# Patient Record
Sex: FEMALE | Race: OTHER | Employment: FULL TIME | URBAN - NONMETROPOLITAN AREA
[De-identification: names, ages, dates, MRNs, and addresses within clinical notes are randomized per-mention and may not be internally consistent; named-entity substitution may affect disease eponyms.]

---

## 2023-12-26 ENCOUNTER — HOSPITAL ENCOUNTER (EMERGENCY)
Facility: HOSPITAL | Age: 28
Discharge: HOME | End: 2023-12-26
Attending: EMERGENCY MEDICINE

## 2023-12-26 VITALS
HEART RATE: 92 BPM | DIASTOLIC BLOOD PRESSURE: 62 MMHG | RESPIRATION RATE: 15 BRPM | OXYGEN SATURATION: 98 % | WEIGHT: 164 LBS | SYSTOLIC BLOOD PRESSURE: 105 MMHG | TEMPERATURE: 98 F

## 2023-12-26 DIAGNOSIS — R11.2 NAUSEA & VOMITING: ICD-10-CM

## 2023-12-26 DIAGNOSIS — N39.0 UTI (URINARY TRACT INFECTION): Primary | ICD-10-CM

## 2023-12-26 DIAGNOSIS — M79.10 MYALGIA: ICD-10-CM

## 2023-12-26 LAB
ALBUMIN SERPL BCP-MCNC: 4.3 G/DL (ref 3.5–5)
ALP SERPL-CCNC: 58 U/L (ref 34–104)
ALT SERPL W P-5'-P-CCNC: 13 U/L (ref 7–52)
ANION GAP SERPL CALCULATED.3IONS-SCNC: 9 MMOL/L
AST SERPL W P-5'-P-CCNC: 13 U/L (ref 13–39)
BACTERIA UR QL AUTO: ABNORMAL /HPF
BILIRUB SERPL-MCNC: 0.41 MG/DL (ref 0.2–1)
BILIRUB UR QL STRIP: NEGATIVE
BUN SERPL-MCNC: 13 MG/DL (ref 5–25)
CALCIUM SERPL-MCNC: 8.8 MG/DL (ref 8.4–10.2)
CHLORIDE SERPL-SCNC: 102 MMOL/L (ref 96–108)
CLARITY UR: CLEAR
CO2 SERPL-SCNC: 25 MMOL/L (ref 21–32)
COLOR UR: YELLOW
CREAT SERPL-MCNC: 0.46 MG/DL (ref 0.6–1.3)
EXT PREGNANCY TEST URINE: NEGATIVE
EXT. CONTROL: NORMAL
FLUAV RNA RESP QL NAA+PROBE: NEGATIVE
FLUBV RNA RESP QL NAA+PROBE: NEGATIVE
GFR SERPL CREATININE-BSD FRML MDRD: 136 ML/MIN/1.73SQ M
GLUCOSE SERPL-MCNC: 115 MG/DL (ref 65–140)
GLUCOSE UR STRIP-MCNC: NEGATIVE MG/DL
HGB UR QL STRIP.AUTO: ABNORMAL
KETONES UR STRIP-MCNC: NEGATIVE MG/DL
LEUKOCYTE ESTERASE UR QL STRIP: ABNORMAL
LIPASE SERPL-CCNC: 21 U/L (ref 11–82)
NITRITE UR QL STRIP: NEGATIVE
NON-SQ EPI CELLS URNS QL MICRO: ABNORMAL /HPF
OTHER STN SPEC: ABNORMAL
PH UR STRIP.AUTO: 6.5 [PH]
POTASSIUM SERPL-SCNC: 3.5 MMOL/L (ref 3.5–5.3)
PROT SERPL-MCNC: 7.1 G/DL (ref 6.4–8.4)
PROT UR STRIP-MCNC: NEGATIVE MG/DL
RBC #/AREA URNS AUTO: ABNORMAL /HPF
RSV RNA RESP QL NAA+PROBE: NEGATIVE
SARS-COV-2 RNA RESP QL NAA+PROBE: NEGATIVE
SODIUM SERPL-SCNC: 136 MMOL/L (ref 135–147)
SP GR UR STRIP.AUTO: 1.02 (ref 1–1.03)
UROBILINOGEN UR QL STRIP.AUTO: 0.2 E.U./DL
WBC #/AREA URNS AUTO: ABNORMAL /HPF

## 2023-12-26 PROCEDURE — 96374 THER/PROPH/DIAG INJ IV PUSH: CPT

## 2023-12-26 PROCEDURE — 80053 COMPREHEN METABOLIC PANEL: CPT | Performed by: EMERGENCY MEDICINE

## 2023-12-26 PROCEDURE — 81001 URINALYSIS AUTO W/SCOPE: CPT | Performed by: EMERGENCY MEDICINE

## 2023-12-26 PROCEDURE — 81025 URINE PREGNANCY TEST: CPT | Performed by: EMERGENCY MEDICINE

## 2023-12-26 PROCEDURE — 99284 EMERGENCY DEPT VISIT MOD MDM: CPT

## 2023-12-26 PROCEDURE — 0241U HB NFCT DS VIR RESP RNA 4 TRGT: CPT | Performed by: EMERGENCY MEDICINE

## 2023-12-26 PROCEDURE — 83690 ASSAY OF LIPASE: CPT | Performed by: EMERGENCY MEDICINE

## 2023-12-26 PROCEDURE — 96375 TX/PRO/DX INJ NEW DRUG ADDON: CPT

## 2023-12-26 PROCEDURE — 96361 HYDRATE IV INFUSION ADD-ON: CPT

## 2023-12-26 PROCEDURE — 85025 COMPLETE CBC W/AUTO DIFF WBC: CPT | Performed by: EMERGENCY MEDICINE

## 2023-12-26 PROCEDURE — 93005 ELECTROCARDIOGRAM TRACING: CPT

## 2023-12-26 PROCEDURE — 36415 COLL VENOUS BLD VENIPUNCTURE: CPT | Performed by: EMERGENCY MEDICINE

## 2023-12-26 PROCEDURE — 99284 EMERGENCY DEPT VISIT MOD MDM: CPT | Performed by: EMERGENCY MEDICINE

## 2023-12-26 PROCEDURE — 87651 STREP A DNA AMP PROBE: CPT | Performed by: EMERGENCY MEDICINE

## 2023-12-26 RX ORDER — ONDANSETRON 4 MG/1
4 TABLET, ORALLY DISINTEGRATING ORAL EVERY 6 HOURS PRN
Qty: 20 TABLET | Refills: 0 | Status: SHIPPED | OUTPATIENT
Start: 2023-12-26

## 2023-12-26 RX ORDER — ACETAMINOPHEN 325 MG/1
650 TABLET ORAL ONCE
Status: COMPLETED | OUTPATIENT
Start: 2023-12-26 | End: 2023-12-26

## 2023-12-26 RX ORDER — METOCLOPRAMIDE HYDROCHLORIDE 5 MG/ML
10 INJECTION INTRAMUSCULAR; INTRAVENOUS ONCE
Status: COMPLETED | OUTPATIENT
Start: 2023-12-26 | End: 2023-12-26

## 2023-12-26 RX ORDER — NAPROXEN 500 MG/1
500 TABLET ORAL 2 TIMES DAILY WITH MEALS
Qty: 30 TABLET | Refills: 0 | Status: SHIPPED | OUTPATIENT
Start: 2023-12-26

## 2023-12-26 RX ORDER — DIPHENHYDRAMINE HYDROCHLORIDE 50 MG/ML
25 INJECTION INTRAMUSCULAR; INTRAVENOUS ONCE
Status: COMPLETED | OUTPATIENT
Start: 2023-12-26 | End: 2023-12-26

## 2023-12-26 RX ORDER — KETOROLAC TROMETHAMINE 30 MG/ML
30 INJECTION, SOLUTION INTRAMUSCULAR; INTRAVENOUS ONCE
Status: COMPLETED | OUTPATIENT
Start: 2023-12-26 | End: 2023-12-26

## 2023-12-26 RX ORDER — NITROFURANTOIN 25; 75 MG/1; MG/1
100 CAPSULE ORAL ONCE
Status: COMPLETED | OUTPATIENT
Start: 2023-12-26 | End: 2023-12-26

## 2023-12-26 RX ORDER — NITROFURANTOIN 25; 75 MG/1; MG/1
100 CAPSULE ORAL 2 TIMES DAILY
Qty: 10 CAPSULE | Refills: 0 | Status: SHIPPED | OUTPATIENT
Start: 2023-12-26

## 2023-12-26 RX ADMIN — ACETAMINOPHEN 650 MG: 325 TABLET, FILM COATED ORAL at 22:26

## 2023-12-26 RX ADMIN — NITROFURANTOIN (MONOHYDRATE/MACROCRYSTALS) 100 MG: 75; 25 CAPSULE ORAL at 23:25

## 2023-12-26 RX ADMIN — METOCLOPRAMIDE 10 MG: 5 INJECTION, SOLUTION INTRAMUSCULAR; INTRAVENOUS at 22:26

## 2023-12-26 RX ADMIN — KETOROLAC TROMETHAMINE 30 MG: 30 INJECTION, SOLUTION INTRAMUSCULAR; INTRAVENOUS at 22:26

## 2023-12-26 RX ADMIN — SODIUM CHLORIDE 1000 ML: 0.9 INJECTION, SOLUTION INTRAVENOUS at 22:26

## 2023-12-26 RX ADMIN — DIPHENHYDRAMINE HYDROCHLORIDE 25 MG: 50 INJECTION, SOLUTION INTRAMUSCULAR; INTRAVENOUS at 22:26

## 2023-12-26 NOTE — Clinical Note
Yarinne Veloz Torres was seen and treated in our emergency department on 12/26/2023.                Diagnosis:     Yarinne  .    She may return on this date: 12/29/2023         If you have any questions or concerns, please don't hesitate to call.      Houston Sharma, DO    ______________________________           _______________          _______________  Hospital Representative                              Date                                Time

## 2023-12-27 LAB
ATRIAL RATE: 89 BPM
BASOPHILS # BLD AUTO: 0.02 THOUSANDS/ÂΜL (ref 0–0.1)
BASOPHILS NFR BLD AUTO: 0 % (ref 0–1)
EOSINOPHIL # BLD AUTO: 0.07 THOUSAND/ÂΜL (ref 0–0.61)
EOSINOPHIL NFR BLD AUTO: 1 % (ref 0–6)
ERYTHROCYTE [DISTWIDTH] IN BLOOD BY AUTOMATED COUNT: 11.3 % (ref 11.6–15.1)
HCT VFR BLD AUTO: 42.4 % (ref 34.8–46.1)
HGB BLD-MCNC: 13.8 G/DL (ref 11.5–15.4)
IMM GRANULOCYTES # BLD AUTO: 0.03 THOUSAND/UL (ref 0–0.2)
IMM GRANULOCYTES NFR BLD AUTO: 0 % (ref 0–2)
LYMPHOCYTES # BLD AUTO: 0.62 THOUSANDS/ÂΜL (ref 0.6–4.47)
LYMPHOCYTES NFR BLD AUTO: 5 % (ref 14–44)
MCH RBC QN AUTO: 30.7 PG (ref 26.8–34.3)
MCHC RBC AUTO-ENTMCNC: 32.5 G/DL (ref 31.4–37.4)
MCV RBC AUTO: 94 FL (ref 82–98)
MONOCYTES # BLD AUTO: 0.38 THOUSAND/ÂΜL (ref 0.17–1.22)
MONOCYTES NFR BLD AUTO: 3 % (ref 4–12)
NEUTROPHILS # BLD AUTO: 11.08 THOUSANDS/ÂΜL (ref 1.85–7.62)
NEUTS SEG NFR BLD AUTO: 91 % (ref 43–75)
NRBC BLD AUTO-RTO: 0 /100 WBCS
P AXIS: 65 DEGREES
PLATELET # BLD AUTO: 198 THOUSANDS/UL (ref 149–390)
PMV BLD AUTO: 13 FL (ref 8.9–12.7)
PR INTERVAL: 160 MS
QRS AXIS: 67 DEGREES
QRSD INTERVAL: 80 MS
QT INTERVAL: 354 MS
QTC INTERVAL: 430 MS
RBC # BLD AUTO: 4.49 MILLION/UL (ref 3.81–5.12)
S PYO DNA THROAT QL NAA+PROBE: NOT DETECTED
T WAVE AXIS: 26 DEGREES
VENTRICULAR RATE: 89 BPM
WBC # BLD AUTO: 12.2 THOUSAND/UL (ref 4.31–10.16)

## 2023-12-27 NOTE — ED PROVIDER NOTES
History  Chief Complaint   Patient presents with    Abdominal Pain     Onset this morning. C/O lower/mid back pain, N/V       Abdominal Pain  Associated symptoms: chills, fatigue, fever, nausea, sore throat and vomiting    Associated symptoms: no chest pain, no cough, no diarrhea, no dysuria, no hematuria and no shortness of breath    This is a 28-year-old female she has a past medical history significant for lumbar disc disease and intermittent low back pain, history of provoked PE in the setting of pregnancy on anticoagulation x 6 months, no longer on anticoagulation, presenting for 2-day history of gradual onset fatigue malaise fevers chills headache sore throat nausea vomiting upper abdominal pain myalgias.    Patient reports positive sick contacts.  Reports symptoms gradually worsened throughout the day.  Notes some bodyaches pain in the flank and the thighs and the shoulder and back region.  Notes some epigastric discomfort.  Denies any right lower quadrant or right upper quadrant abdominal pain.  Reports no prior surgical history.  Denies pregnancy vaginal bleeding vaginal discharge pelvic pain.  Reports no urinary symptoms but reports is peeing less.  Has decreased p.o. tolerance today.  Reports the aches and pains she has experienced in the past.  Regarding back pain patient denies any focal weakness, saddle anesthesia, numbness, tingling.    No known drug allergies.    None       Past Medical History:   Diagnosis Date    Blood clot embolism during pregnancy, antepartum        No past surgical history on file.    No family history on file.  I have reviewed and agree with the history as documented.    E-Cigarette/Vaping     E-Cigarette/Vaping Substances          Review of Systems   Constitutional:  Positive for activity change, appetite change, chills, fatigue and fever.   HENT:  Positive for congestion, postnasal drip and sore throat. Negative for ear pain.    Eyes:  Negative for pain and visual  disturbance.   Respiratory:  Negative for cough and shortness of breath.    Cardiovascular:  Negative for chest pain and palpitations.   Gastrointestinal:  Positive for abdominal pain, nausea and vomiting. Negative for abdominal distention and diarrhea.   Genitourinary:  Negative for dysuria, flank pain, hematuria, menstrual problem and pelvic pain.   Musculoskeletal:  Positive for back pain and myalgias. Negative for arthralgias, neck pain and neck stiffness.   Skin:  Negative for color change and rash.   Neurological:  Negative for seizures and syncope.   All other systems reviewed and are negative.      Physical Exam  Physical Exam  Vitals and nursing note reviewed. Exam conducted with a chaperone present.   Constitutional:       General: She is in acute distress.      Appearance: She is well-developed. She is ill-appearing. She is not toxic-appearing or diaphoretic.   HENT:      Head: Normocephalic and atraumatic.      Mouth/Throat:      Mouth: Mucous membranes are moist.      Pharynx: Oropharynx is clear. Posterior oropharyngeal erythema present.      Tonsils: No tonsillar exudate or tonsillar abscesses. 2+ on the right. 2+ on the left.      Comments: Chronically enlarged tonsils bilaterally.  No asymmetry.  No stridor.  No dysphonia.  No Ludewig's angina.  Chronic dental decay with prior dental work.  No signs of acute dental abscess.  No oral mucosal lesions.  Posterior oropharyngeal erythema.  No exudates.  Eyes:      General: No scleral icterus.     Extraocular Movements: Extraocular movements intact.   Cardiovascular:      Rate and Rhythm: Normal rate and regular rhythm.      Heart sounds: Normal heart sounds.   Pulmonary:      Effort: Pulmonary effort is normal.      Breath sounds: Normal breath sounds. No wheezing, rhonchi or rales.   Abdominal:      General: There is no distension.      Palpations: Abdomen is soft.      Tenderness: There is abdominal tenderness in the epigastric area. There is no right  CVA tenderness, left CVA tenderness, guarding or rebound. Negative signs include Guzman's sign and McBurney's sign.   Skin:     General: Skin is warm and dry.      Capillary Refill: Capillary refill takes less than 2 seconds.   Neurological:      General: No focal deficit present.      Mental Status: She is alert.         Vital Signs  ED Triage Vitals [12/26/23 2117]   Temperature Pulse Respirations Blood Pressure SpO2   98 °F (36.7 °C) 89 18 119/60 97 %      Temp Source Heart Rate Source Patient Position - Orthostatic VS BP Location FiO2 (%)   Temporal Monitor Lying Right arm --      Pain Score       8           Vitals:    12/26/23 2117 12/26/23 2330   BP: 119/60 105/62   Pulse: 89 92   Patient Position - Orthostatic VS: Lying Lying         Visual Acuity      ED Medications  Medications   sodium chloride 0.9 % bolus 1,000 mL (1,000 mL Intravenous New Bag 12/26/23 2226)   metoclopramide (REGLAN) injection 10 mg (10 mg Intravenous Given 12/26/23 2226)   diphenhydrAMINE (BENADRYL) injection 25 mg (25 mg Intravenous Given 12/26/23 2226)   ketorolac (TORADOL) injection 30 mg (30 mg Intravenous Given 12/26/23 2226)   acetaminophen (TYLENOL) tablet 650 mg (650 mg Oral Given 12/26/23 2226)   nitrofurantoin (MACROBID) extended-release capsule 100 mg (100 mg Oral Given 12/26/23 2325)       Diagnostic Studies  Results Reviewed       Procedure Component Value Units Date/Time    Strep A PCR [[id number]]     Lab Status: No result Specimen: Throat     FLU/RSV/COVID - if FLU/RSV clinically relevant [[id number]]  (Normal) Collected: 12/26/23 2206    Lab Status: Final result Specimen: Nares from Nose Updated: 12/26/23 2304     SARS-CoV-2 Negative     INFLUENZA A PCR Negative     INFLUENZA B PCR Negative     RSV PCR Negative    Narrative:      FOR PEDIATRIC PATIENTS - copy/paste COVID Guidelines URL to browser: [email or web address]    SARS-CoV-2 assay is a Nucleic Acid Amplification  assay intended for the  qualitative detection of nucleic acid from SARS-CoV-2 in nasopharyngeal  swabs. Results are for the presumptive identification of SARS-CoV-2 RNA.    Positive results are indicative of infection with SARS-CoV-2, the virus  causing COVID-19, but do not rule out bacterial infection or co-infection  with other viruses. Laboratories within the [place] and its  territories are required to report all positive results to the appropriate  public health authorities. Negative results do not preclude SARS-CoV-2  infection and should not be used as the sole basis for treatment or other  patient management decisions. Negative results must be combined with  clinical observations, patient history, and epidemiological information.  This test has not been FDA cleared or approved.    This test has been authorized by FDA under an Emergency Use Authorization  (EUA). This test is only authorized for the duration of time the  declaration that circumstances exist justifying the authorization of the  emergency use of an in vitro diagnostic tests for detection of SARS-CoV-2  virus and/or diagnosis of COVID-19 infection under section 564(b)(1) of  the Act, 21 U.S.C. 360bbb-3(b)(1), unless the authorization is terminated  or revoked sooner. The test has been validated but independent review by FDA  and CLIA is pending.    Test performed using [employer] GeneXpert: This RT-PCR assay targets N2,  a region unique to SARS-CoV-2. A conserved region in the E-gene was chosen  for pan-Sarbecovirus detection which includes SARS-CoV-2.    According to CMS-2020-01-R, this platform meets the definition of high-throughput technology.    Urine Microscopic [[id number]]  (Abnormal) Collected: 12/26/23 2206    Lab Status: Final result Specimen: Urine, Clean Catch Updated: 12/26/23 2243     RBC, UA 4-10 /hpf      WBC, UA 4-10 /hpf      Epithelial Cells Occasional /hpf      Bacteria, UA Moderate /hpf      OTHER OBSERVATIONS Transitional  Epithelial Cells    Comprehensive metabolic panel [[id number]]  (Abnormal) Collected: 12/26/23 2200    Lab Status: Final result Specimen: Blood from Arm, Right Updated: 12/26/23 2236     Sodium 136 mmol/L      Potassium 3.5 mmol/L      Chloride 102 mmol/L      CO2 25 mmol/L      ANION GAP 9 mmol/L      BUN 13 mg/dL      Creatinine 0.46 mg/dL      Glucose 115 mg/dL      Calcium 8.8 mg/dL      AST 13 U/L      ALT 13 U/L      Alkaline Phosphatase 58 U/L      Total Protein 7.1 g/dL      Albumin 4.3 g/dL      Total Bilirubin 0.41 mg/dL      eGFR 136 ml/min/1.73sq m     Narrative:      National Kidney Disease Foundation guidelines for Chronic Kidney Disease (CKD):     Stage 1 with normal or high GFR (GFR > 90 mL/min/1.73 square meters)    Stage 2 Mild CKD (GFR = 60-89 mL/min/1.73 square meters)    Stage 3A Moderate CKD (GFR = 45-59 mL/min/1.73 square meters)    Stage 3B Moderate CKD (GFR = 30-44 mL/min/1.73 square meters)    Stage 4 Severe CKD (GFR = 15-29 mL/min/1.73 square meters)    Stage 5 End Stage CKD (GFR <15 mL/min/1.73 square meters)  Note: GFR calculation is accurate only with a steady state creatinine    Lipase [[id number]]  (Normal) Collected: 12/26/23 2200    Lab Status: Final result Specimen: Blood from Arm, Right Updated: 12/26/23 2236     Lipase 21 u/L     UA w Reflex to Microscopic w Reflex to Culture [[id number]]  (Abnormal) Collected: 12/26/23 2206    Lab Status: Final result Specimen: Urine, Clean Catch Updated: 12/26/23 2230     Color, UA Yellow     Clarity, UA Clear     Specific Gravity, UA 1.025     pH, UA 6.5     Leukocytes, UA Trace     Nitrite, UA Negative     Protein, UA Negative mg/dl      Glucose, UA Negative mg/dl      Ketones, UA Negative mg/dl      Urobilinogen, UA 0.2 E.U./dl      Bilirubin, UA Negative     Occult Blood, UA Trace-Intact    CBC and differential [[id number]]  (Abnormal) Collected: 12/26/23 2200    Lab Status: Preliminary result Specimen: Blood from Arm, Right Updated: 12/26/23  2221     WBC 12.20 Thousand/uL      RBC 4.49 Million/uL      Hemoglobin 13.8 g/dL      Hematocrit 42.4 %      MCV 94 fL      MCH 30.7 pg      MCHC 32.5 g/dL      RDW 11.3 %      MPV 13.0 fL      Platelets 198 Thousands/uL     POCT pregnancy, urine [[id number]]  (Normal) Resulted: 12/26/23 2207    Lab Status: Final result Specimen: Urine Updated: 12/26/23 2207     EXT Preg Test, Ur Negative     Control Valid                   No orders to display              Procedures  Procedures         ED Course  ED Course as of 12/26/23 2336   Tue Dec 26, 2023   2201 Pulse: 89   2201 Respirations: 18   2201 SpO2: 100 %   2201 Temperature: 98 °F (36.7 °C)   2201 Blood Pressure: 119/60   2212 PREGNANCY TEST URINE: Negative   2320 WBC(!): 12.20   2320 Bacteria, UA(!): Moderate   2320 RBC, UA(!): 4-10   2320 WBC, UA(!): 4-10   2327 Patient reassessed made aware of the findings of UTI.  She feels improved medications.  I did discuss with her that we are not obtaining a CT scan of her abdomen/pelvis at this time because she is having generalized, nonspecific or low concern symptoms she has some mild intermittent back pain with a history of acute on chronic back pain she has no neurologic symptoms associated with that she has no right upper quadrant or right lower quadrant pain and no CVA tenderness bilaterally or complaints of left or right flank pain.  She is agreeable to not proceeding with CT scan she is advised to return here if she does not notice any improvement or develops flank pain throughout the next several days or cannot keep her medications down.  We will send with Zofran, Macrobid, pain medication and provide work note at this time she is comfortable to plan for discharge.                                             Medical Decision Making  28-year-old female who presents to the ER for evaluation of 2-day history of nausea vomiting generalized abdominal discomfort associated with acute on chronic low back pain without red  flag features and myalgias.  High suspicion for viral syndrome although afebrile here.  Does have pharyngitis on exam but does not appear to represent strep will check swab.    Labs reveal a mild leukocytosis and a UTI.  Viral swabs are negative.  Strep swab pending at time of disposition.  Patient will improve medications received IV fluids antiemetic pain control.  Ultimately suspect UTI as main component of symptoms possibly aggravation of her chronic back pain which again does not have intact features and does not require further workup at this time as well as possibility of other viral syndrome contributing to symptoms.  Patient agreeable for plan for outpatient management.  Discussed not obtaining CT scan based on nonspecific symptoms and low suspicion for intra-abdominal surgical pathology.  Patient is agreeable to plan will return symptoms worsen.    Problems Addressed:  Myalgia: acute illness or injury  Nausea & vomiting: acute illness or injury  UTI (urinary tract infection): acute illness or injury    Amount and/or Complexity of Data Reviewed  Labs: ordered. Decision-making details documented in ED Course.    Risk  OTC drugs.  Prescription drug management.             Disposition  Final diagnoses:   UTI (urinary tract infection)   Nausea & vomiting   Myalgia     Time reflects when diagnosis was documented in both MDM as applicable and the Disposition within this note       Time User Action Codes Description Comment    12/26/2023 11:21 PM Houston Sharma [N39.0] UTI (urinary tract infection)     12/26/2023 11:31 PM Houston Sharma [R11.2] Nausea & vomiting     12/26/2023 11:31 PM Houston Sharma [M79.10] Myalgia           ED Disposition       ED Disposition   Discharge    Condition   Stable    Date/Time   Tue Dec 26, 2023 11:31 PM    Comment   Yarinne Veloz Torres discharge to home/self care.                   Follow-up Information       Follow up With Specialties Details Why  Contact Info Additional Information    Guzman Silveira DO Emergency Medicine Schedule an appointment as soon as possible for a visit  As needed [address]  [place] [address]  [phone]       [facility] Emergency Department Emergency Medicine Go to  If symptoms worsen [address] [id number] [phone] [facility] Emergency Department, [address], [place], [address]            Patient's Medications   Discharge Prescriptions    NAPROXEN (NAPROSYN) 500 MG TABLET    Take 1 tablet (500 mg total) by mouth 2 (two) times a day with meals       Start Date: 12/26/2023End Date: --       Order Dose: 500 mg       Quantity: 30 tablet    Refills: 0    NITROFURANTOIN (MACROBID) 100 MG CAPSULE    Take 1 capsule (100 mg total) by mouth 2 (two) times a day       Start Date: 12/26/2023End Date: --       Order Dose: 100 mg       Quantity: 10 capsule    Refills: 0    ONDANSETRON (ZOFRAN ODT) 4 MG DISINTEGRATING TABLET    Take 1 tablet (4 mg total) by mouth every 6 (six) hours as needed for nausea or vomiting       Start Date: 12/26/2023End Date: --       Order Dose: 4 mg       Quantity: 20 tablet    Refills: 0       No discharge procedures on file.    PDMP Review       None            ED Provider  Electronically Signed by             Houston Sharma DO  12/26/23 2524

## 2023-12-27 NOTE — DISCHARGE INSTRUCTIONS
Thank you for visiting the Emergency Department today.    FLU, COVID, RSV negative.   Urine shows an infection (UTI)  Treatment is antibiotic, nausea medicine, pain medicine  Return if symptoms worsen  Pregnancy test negative.

## 2024-07-20 ENCOUNTER — HOSPITAL ENCOUNTER (EMERGENCY)
Facility: HOSPITAL | Age: 29
Discharge: HOME | End: 2024-07-20
Attending: EMERGENCY MEDICINE

## 2024-07-20 ENCOUNTER — APPOINTMENT (EMERGENCY)
Dept: CT IMAGING | Facility: HOSPITAL | Age: 29
End: 2024-07-20

## 2024-07-20 VITALS
HEIGHT: 64 IN | OXYGEN SATURATION: 95 % | TEMPERATURE: 97.6 F | WEIGHT: 164 LBS | BODY MASS INDEX: 28 KG/M2 | DIASTOLIC BLOOD PRESSURE: 74 MMHG | HEART RATE: 78 BPM | SYSTOLIC BLOOD PRESSURE: 103 MMHG | RESPIRATION RATE: 20 BRPM

## 2024-07-20 DIAGNOSIS — R06.02 SHORTNESS OF BREATH: ICD-10-CM

## 2024-07-20 DIAGNOSIS — R05.9 COUGH: ICD-10-CM

## 2024-07-20 DIAGNOSIS — R07.89 ATYPICAL CHEST PAIN: Primary | ICD-10-CM

## 2024-07-20 LAB
ANION GAP SERPL CALCULATED.3IONS-SCNC: 10 MMOL/L (ref 4–13)
BASOPHILS # BLD AUTO: 0.07 THOUSANDS/ÂΜL (ref 0–0.1)
BASOPHILS NFR BLD AUTO: 1 % (ref 0–1)
BUN SERPL-MCNC: 15 MG/DL (ref 5–25)
CALCIUM SERPL-MCNC: 9.8 MG/DL (ref 8.4–10.2)
CARDIAC TROPONIN I PNL SERPL HS: <2 NG/L
CHLORIDE SERPL-SCNC: 101 MMOL/L (ref 96–108)
CO2 SERPL-SCNC: 28 MMOL/L (ref 21–32)
CREAT SERPL-MCNC: 0.9 MG/DL (ref 0.6–1.3)
EOSINOPHIL # BLD AUTO: 0.69 THOUSAND/ÂΜL (ref 0–0.61)
EOSINOPHIL NFR BLD AUTO: 7 % (ref 0–6)
ERYTHROCYTE [DISTWIDTH] IN BLOOD BY AUTOMATED COUNT: 11.9 % (ref 11.6–15.1)
EXT PREGNANCY TEST URINE: NEGATIVE
EXT. CONTROL: NORMAL
GFR SERPL CREATININE-BSD FRML MDRD: 87 ML/MIN/1.73SQ M
GLUCOSE SERPL-MCNC: 104 MG/DL (ref 65–140)
HCT VFR BLD AUTO: 45.3 % (ref 34.8–46.1)
HGB BLD-MCNC: 14.8 G/DL (ref 11.5–15.4)
IMM GRANULOCYTES # BLD AUTO: 0.02 THOUSAND/UL (ref 0–0.2)
IMM GRANULOCYTES NFR BLD AUTO: 0 % (ref 0–2)
LYMPHOCYTES # BLD AUTO: 2.45 THOUSANDS/ÂΜL (ref 0.6–4.47)
LYMPHOCYTES NFR BLD AUTO: 25 % (ref 14–44)
MCH RBC QN AUTO: 31.2 PG (ref 26.8–34.3)
MCHC RBC AUTO-ENTMCNC: 32.7 G/DL (ref 31.4–37.4)
MCV RBC AUTO: 95 FL (ref 82–98)
MONOCYTES # BLD AUTO: 0.61 THOUSAND/ÂΜL (ref 0.17–1.22)
MONOCYTES NFR BLD AUTO: 6 % (ref 4–12)
NEUTROPHILS # BLD AUTO: 5.9 THOUSANDS/ÂΜL (ref 1.85–7.62)
NEUTS SEG NFR BLD AUTO: 61 % (ref 43–75)
NRBC BLD AUTO-RTO: 0 /100 WBCS
PLATELET # BLD AUTO: 170 THOUSANDS/UL (ref 149–390)
PMV BLD AUTO: 13.2 FL (ref 8.9–12.7)
POTASSIUM SERPL-SCNC: 3.7 MMOL/L (ref 3.5–5.3)
RBC # BLD AUTO: 4.75 MILLION/UL (ref 3.81–5.12)
SODIUM SERPL-SCNC: 139 MMOL/L (ref 135–147)
WBC # BLD AUTO: 9.74 THOUSAND/UL (ref 4.31–10.16)

## 2024-07-20 PROCEDURE — 36415 COLL VENOUS BLD VENIPUNCTURE: CPT | Performed by: EMERGENCY MEDICINE

## 2024-07-20 PROCEDURE — 81025 URINE PREGNANCY TEST: CPT | Performed by: EMERGENCY MEDICINE

## 2024-07-20 PROCEDURE — 96374 THER/PROPH/DIAG INJ IV PUSH: CPT

## 2024-07-20 PROCEDURE — 99285 EMERGENCY DEPT VISIT HI MDM: CPT

## 2024-07-20 PROCEDURE — 84484 ASSAY OF TROPONIN QUANT: CPT | Performed by: EMERGENCY MEDICINE

## 2024-07-20 PROCEDURE — 99285 EMERGENCY DEPT VISIT HI MDM: CPT | Performed by: EMERGENCY MEDICINE

## 2024-07-20 PROCEDURE — 80048 BASIC METABOLIC PNL TOTAL CA: CPT | Performed by: EMERGENCY MEDICINE

## 2024-07-20 PROCEDURE — 93005 ELECTROCARDIOGRAM TRACING: CPT

## 2024-07-20 PROCEDURE — 71275 CT ANGIOGRAPHY CHEST: CPT

## 2024-07-20 PROCEDURE — 85025 COMPLETE CBC W/AUTO DIFF WBC: CPT | Performed by: EMERGENCY MEDICINE

## 2024-07-20 RX ORDER — PREDNISONE 20 MG/1
40 TABLET ORAL ONCE
Status: COMPLETED | OUTPATIENT
Start: 2024-07-20 | End: 2024-07-20

## 2024-07-20 RX ORDER — ALBUTEROL SULFATE 90 UG/1
2 AEROSOL, METERED RESPIRATORY (INHALATION) EVERY 6 HOURS PRN
Qty: 18 G | Refills: 0 | Status: SHIPPED | OUTPATIENT
Start: 2024-07-20

## 2024-07-20 RX ORDER — PREDNISONE 20 MG/1
40 TABLET ORAL DAILY
Qty: 8 TABLET | Refills: 0 | Status: SHIPPED | OUTPATIENT
Start: 2024-07-20 | End: 2024-07-24

## 2024-07-20 RX ORDER — ALBUTEROL SULFATE 90 UG/1
4 AEROSOL, METERED RESPIRATORY (INHALATION) ONCE
Status: COMPLETED | OUTPATIENT
Start: 2024-07-20 | End: 2024-07-20

## 2024-07-20 RX ORDER — KETOROLAC TROMETHAMINE 30 MG/ML
15 INJECTION, SOLUTION INTRAMUSCULAR; INTRAVENOUS ONCE
Status: COMPLETED | OUTPATIENT
Start: 2024-07-20 | End: 2024-07-20

## 2024-07-20 RX ADMIN — ALBUTEROL SULFATE 4 PUFF: 90 AEROSOL, METERED RESPIRATORY (INHALATION) at 19:33

## 2024-07-20 RX ADMIN — IOHEXOL 85 ML: 350 INJECTION, SOLUTION INTRAVENOUS at 16:40

## 2024-07-20 RX ADMIN — KETOROLAC TROMETHAMINE 15 MG: 30 INJECTION, SOLUTION INTRAMUSCULAR at 17:18

## 2024-07-20 RX ADMIN — PREDNISONE 40 MG: 20 TABLET ORAL at 19:33

## 2024-07-20 NOTE — ED PROVIDER NOTES
EMERGENCY DEPARTMENT ENCOUNTER NOTE    This note has been generated using a voice recognition software. There may be typographic, grammatic, or word substitution errors that have escaped editorial review.    Emergency Department Note- Yarinne Veloz Torres 29 y.o. female MRN: [id number]    Unit/Bed#: RM04 Encounter: [id number]  ?  CHIEF COMPLAINT  Chief Complaint   Patient presents with    Chest Pain     Patient reports intermittent chest pain that radiates into back with sob since 0400       HPI  Yarinne Veloz Torres is a 29 y.o. female with PMH of pulmonary embolism during pregnancy 3 years ago, status post anticoagulation x 6 months after that, presenting with chest pain with radiation to the back.  Patient woke up this morning around 4 in the morning.  She developed upper central chest pain with radiation to bilateral upper back that is pressure-like and sharp, worsened with inspiration.  Patient feels winded.  She does have a mild cough.  She was well up until this started this morning.  No fevers.  No nausea or vomiting.  No abdominal pain.  Denies chance of pregnancy.  No leg swelling.  No recent prolonged immobilization.    REVIEW OF SYSTEMS    Constitutional: no fevers  Cardiac: As above  Respiratory:  as above  GI: no abdominal pain  Endocrine: no diabetes  Neuro: no new focal weakness or numbness    PAST MEDICAL HISTORY  Past Medical History:   Diagnosis Date    Blood clot embolism during pregnancy, antepartum        SURGICAL HISTORY  History reviewed. No pertinent surgical history.    FAMILY HISTORY  History reviewed. No pertinent family history.     CURRENT MEDICATIONS  No current facility-administered medications on file prior to encounter.     Current Outpatient Medications on File Prior to Encounter   Medication Sig    naproxen (Naprosyn) 500 mg tablet Take 1 tablet (500 mg total) by mouth 2 (two) times a day with meals    nitrofurantoin (MACROBID) 100 mg capsule Take 1 capsule (100 mg total) by  mouth 2 (two) times a day    ondansetron (Zofran ODT) 4 mg disintegrating tablet Take 1 tablet (4 mg total) by mouth every 6 (six) hours as needed for nausea or vomiting       ALLERGIES  No Known Allergies    SOCIAL HISTORY  Social History     Socioeconomic History    Marital status: /Civil Union     Spouse name: None    Number of children: None    Years of education: None    Highest education level: None   Occupational History    None   Tobacco Use    Smoking status: Never    Smokeless tobacco: Never   Substance and Sexual Activity    Alcohol use: Never    Drug use: Never    Sexual activity: None   Other Topics Concern    None   Social History Narrative    None     Social Determinants of Health     Financial Resource Strain: Not on file   Food Insecurity: Not on file   Transportation Needs: Not on file   Physical Activity: Not on file   Stress: Not on file   Social Connections: Unknown (6/18/2024)    Received from [employer]     How often do you feel lonely or isolated from those around you? (Adult - for ages 18 years and over): Not on file   Intimate Partner Violence: Not on file   Housing Stability: Not on file       PHYSICAL EXAM    ED Triage Vitals [07/20/24 1459]   Temperature Pulse Respirations Blood Pressure SpO2   97.6 °F (36.4 °C) 98 16 126/68 96 %      Temp Source Heart Rate Source Patient Position - Orthostatic VS BP Location FiO2 (%)   Tympanic Monitor Sitting Right arm --      Pain Score       7         Vital signs and nursing notes reviewed    CONSTITUTIONAL: female appearing stated age resting in bed, in no acute distress  HEENT: atraumatic, normocephalic. Sclera anicteric, conjunctiva are not injected. Moist oral mucosa  CARDIOVASCULAR/CHEST: RRR, no M/R/G. 2+ radial pulses  PULMONARY: Breathing comfortably on RA.  Saturating 94% on room air.  Breath sounds are equal and clear to auscultation bilaterally posteriorly.  There is a subtle wheeze in left upper lung  anteriorly.  Patient does have a dry cough.  ABDOMEN: non-distended.   MSK: moves all extremities, no deformities, no peripheral edema, no calf asymmetry  NEURO: Awake, alert, and oriented x 3. Face symmetric. Moves all extremities spontaneously. No focal neurologic deficits  SKIN: Warm, appears well-perfused  MENTAL STATUS: Normal affect      ?  LABS AND TESTS    Results Reviewed       Procedure Component Value Units Date/Time    POCT pregnancy, urine [[id number]]  (Normal) Resulted: 07/20/24 1640    Lab Status: Final result Updated: 07/20/24 1640     EXT Preg Test, Ur Negative     Control Valid    HS Troponin 0hr (reflex protocol) [[id number]]  (Normal) Collected: 07/20/24 1544    Lab Status: Final result Specimen: Blood from Arm, Right Updated: 07/20/24 1618     hs TnI 0hr <2 ng/L     Basic metabolic panel [[id number]] Collected: 07/20/24 1544    Lab Status: Final result Specimen: Blood from Arm, Right Updated: 07/20/24 1610     Sodium 139 mmol/L      Potassium 3.7 mmol/L      Chloride 101 mmol/L      CO2 28 mmol/L      ANION GAP 10 mmol/L      BUN 15 mg/dL      Creatinine 0.90 mg/dL      Glucose 104 mg/dL      Calcium 9.8 mg/dL      eGFR 87 ml/min/1.73sq m     Narrative:      National Kidney Disease Foundation guidelines for Chronic Kidney Disease (CKD):     Stage 1 with normal or high GFR (GFR > 90 mL/min/1.73 square meters)    Stage 2 Mild CKD (GFR = 60-89 mL/min/1.73 square meters)    Stage 3A Moderate CKD (GFR = 45-59 mL/min/1.73 square meters)    Stage 3B Moderate CKD (GFR = 30-44 mL/min/1.73 square meters)    Stage 4 Severe CKD (GFR = 15-29 mL/min/1.73 square meters)    Stage 5 End Stage CKD (GFR <15 mL/min/1.73 square meters)  Note: GFR calculation is accurate only with a steady state creatinine    CBC and differential [[id number]]  (Abnormal) Collected: 07/20/24 1544    Lab Status: Final result Specimen: Blood from Arm, Right Updated: 07/20/24 1556     WBC 9.74 Thousand/uL      RBC 4.75 Million/uL       Hemoglobin 14.8 g/dL      Hematocrit 45.3 %      MCV 95 fL      MCH 31.2 pg      MCHC 32.7 g/dL      RDW 11.9 %      MPV 13.2 fL      Platelets 170 Thousands/uL      nRBC 0 /100 WBCs      Segmented % 61 %      Immature Grans % 0 %      Lymphocytes % 25 %      Monocytes % 6 %      Eosinophils Relative 7 %      Basophils Relative 1 %      Absolute Neutrophils 5.90 Thousands/µL      Absolute Immature Grans 0.02 Thousand/uL      Absolute Lymphocytes 2.45 Thousands/µL      Absolute Monocytes 0.61 Thousand/µL      Eosinophils Absolute 0.69 Thousand/µL      Basophils Absolute 0.07 Thousands/µL             CTA ED chest PE Study   Final Result by Fish Schulz MD (07/20 1741)      No pulmonary embolism. Clear lungs.                  Workstation performed: [id number]             ED COURSE & MEDICAL DECISION MAKING  ECG 12 Lead Documentation Only    Date/Time: 7/20/2024 3:16 PM    Performed by: Marion Guthrie MD  Authorized by: Marion Guthrie MD    Comments:      Normal sinus rhythm, ventricular rate 89, ID to 146, QRS 80, QTc 430, normal axis, no ST/T wave changes suggest ischemia, no STEMI.  No significant change from prior EKG dated 12/26/2023.           Wells' Criteria for PE      Flowsheet Row Most Recent Value   Wells' Criteria for PE    Clinical signs and symptoms of DVT 0 Filed at: 07/20/2024 1540   PE is primary diagnosis or equally likely 3 Filed at: 07/20/2024 1540   HR >100 1.5 Filed at: 07/20/2024 1540   Immobilization at least 3 days or Surgery in the previous 4 weeks 0 Filed at: 07/20/2024 1540   Previous, objectively diagnosed PE or DVT 1.5 Filed at: 07/20/2024 1540   Hemoptysis 0 Filed at: 07/20/2024 1540   Malignancy with treatment within 6 months or palliative 0 Filed at: 07/20/2024 1540   Wells' Criteria Total 6 Filed at: 07/20/2024 1540            Medications   ketorolac (TORADOL) injection 15 mg (15 mg Intravenous Given 7/20/24 1718)   iohexol (OMNIPAQUE) 350 MG/ML injection (MULTI-DOSE) 85 mL (85 mL  Intravenous Given 7/20/24 1640)   albuterol (PROVENTIL HFA,VENTOLIN HFA) inhaler 4 puff (4 puffs Inhalation Given 7/20/24 1933)   predniSONE tablet 40 mg (40 mg Oral Given 7/20/24 1933)     29-year-old female presenting with chest pain with radiation to bilateral upper back associated with shortness of breath as well as cough.  Vital signs reviewed, afebrile, intermittently tachycardic, saturating down to 94% on room air.  EKG to my interpretation as above.  Differential diagnosis includes musculoskeletal pain, costochondritis, bronchitis, pneumonia, pleuritic chest pain, PE, ACS, perimyocarditis, versus another etiology of symptoms.  Lungs are clear to auscultation.  Given patient's prior provoked PE, clinical suspicion for PE is higher.  Were pursuing labs as well as CT angiography of chest to rule out PE.  ED Course as of 07/20/24 2256   Sat Jul 20, 2024 1809 No evidence of PE, clear lungs   1810 CTA ED chest PE Study     Patient treated symptomatically with albuterol as well and started on a course of prednisone for likely bronchitis.  Follow-up with primary care physician.  Return to emergency department as needed.    Medical Decision Making  Amount and/or Complexity of Data Reviewed  Labs: ordered.  Radiology: ordered. Decision-making details documented in ED Course.    Risk  Prescription drug management.        CLINICAL IMPRESSION  Final diagnoses:   Atypical chest pain   Shortness of breath   Cough       DISPOSITION  Time reflects when diagnosis was documented in both MDM as applicable and the Disposition within this note       Time User Action Codes Description Comment    7/20/2024  7:13 PM Marion Guthrie Add [R07.89] Atypical chest pain     7/20/2024  7:13 PM Marion Guthrie Add [R06.02] Shortness of breath     7/20/2024  7:13 PM Marion Guthrie Add [R05.9] Cough           ED Disposition       ED Disposition   Discharge    Condition   Stable    Date/Time   Sat Jul 20, 2024 1913    Comment   Yarinne Veloz  Kyler discharge to home/self care.                   Follow-up Information       Follow up With Specialties Details Why Contact Info Additional Information    Guzman Silveira,  Emergency Medicine Schedule an appointment as soon as possible for a visit in 1 week Emergency Room Follow-up [address]  [address]  [place] [address]  [phone]       [facility] Emergency Department Emergency Medicine Go to  As needed, If symptoms worsen [address] [address] [phone] [facility] Emergency Department, [address], [place], [address]            DISCHARGE MEDICATIONS  Discharge Medication List as of 7/20/2024  7:22 PM        START taking these medications    Details   albuterol (Ventolin HFA) 90 mcg/act inhaler Inhale 2 puffs every 6 (six) hours as needed for wheezing or shortness of breath, Starting Sat 7/20/2024, Normal      predniSONE 20 mg tablet Take 2 tablets (40 mg total) by mouth daily for 4 days, Starting Sat 7/20/2024, Until Wed 7/24/2024, Normal           CONTINUE these medications which have NOT CHANGED    Details   naproxen (Naprosyn) 500 mg tablet Take 1 tablet (500 mg total) by mouth 2 (two) times a day with meals, Starting Tue 12/26/2023, Normal      nitrofurantoin (MACROBID) 100 mg capsule Take 1 capsule (100 mg total) by mouth 2 (two) times a day, Starting Tue 12/26/2023, Normal      ondansetron (Zofran ODT) 4 mg disintegrating tablet Take 1 tablet (4 mg total) by mouth every 6 (six) hours as needed for nausea or vomiting, Starting Tue 12/26/2023, Normal              Marion Guthrie MD  07/20/24 1503

## 2024-07-20 NOTE — DISCHARGE INSTRUCTIONS
Your work up today shows no evidence of a blood clot, pneumonia, heart attack or other dangerous causes of chest pain.    Please use albuterol inhaler to help with your shortness of breath. Take prednisone 40 mg daily for the next 4 days to help treat your current cough. This is a powerful anti-inflammatory that should help with your shortness of breath and with chest discomfort. You may take Tylenol to help with your chest discomfort.    Please follow up with your primary care physician for re-evaluation of your symptoms. Seek medical attention if you are having worsening pain, fevers, or trouble breathing.

## 2024-07-25 LAB
ATRIAL RATE: 89 BPM
P AXIS: 76 DEGREES
PR INTERVAL: 146 MS
QRS AXIS: 58 DEGREES
QRSD INTERVAL: 80 MS
QT INTERVAL: 354 MS
QTC INTERVAL: 430 MS
T WAVE AXIS: 48 DEGREES
VENTRICULAR RATE: 89 BPM

## 2024-07-25 PROCEDURE — 93010 ELECTROCARDIOGRAM REPORT: CPT | Performed by: INTERNAL MEDICINE

## 2025-05-08 ENCOUNTER — HOSPITAL ENCOUNTER (EMERGENCY)
Facility: HOSPITAL | Age: 30
Discharge: HOME | End: 2025-05-08
Attending: EMERGENCY MEDICINE
Payer: COMMERCIAL

## 2025-05-08 VITALS
DIASTOLIC BLOOD PRESSURE: 58 MMHG | TEMPERATURE: 97.8 F | OXYGEN SATURATION: 96 % | HEART RATE: 78 BPM | RESPIRATION RATE: 16 BRPM | SYSTOLIC BLOOD PRESSURE: 131 MMHG

## 2025-05-08 DIAGNOSIS — N39.0 UTI (URINARY TRACT INFECTION): ICD-10-CM

## 2025-05-08 DIAGNOSIS — R11.2 NAUSEA AND VOMITING: Primary | ICD-10-CM

## 2025-05-08 LAB
ALBUMIN SERPL BCG-MCNC: 4.6 G/DL (ref 3.5–5)
ALP SERPL-CCNC: 53 U/L (ref 34–104)
ALT SERPL W P-5'-P-CCNC: 13 U/L (ref 7–52)
ANION GAP SERPL CALCULATED.3IONS-SCNC: 8 MMOL/L (ref 4–13)
ANISOCYTOSIS BLD QL SMEAR: PRESENT
AST SERPL W P-5'-P-CCNC: 15 U/L (ref 13–39)
BACTERIA UR QL AUTO: ABNORMAL /HPF
BASOPHILS # BLD MANUAL: 0 THOUSAND/UL (ref 0–0.1)
BASOPHILS NFR MAR MANUAL: 0 % (ref 0–1)
BILIRUB SERPL-MCNC: 0.76 MG/DL (ref 0.2–1)
BILIRUB UR QL STRIP: NEGATIVE
BUN SERPL-MCNC: 13 MG/DL (ref 5–25)
CALCIUM SERPL-MCNC: 9 MG/DL (ref 8.4–10.2)
CARDIAC TROPONIN I PNL SERPL HS: <2 NG/L (ref 8–18)
CHLORIDE SERPL-SCNC: 101 MMOL/L (ref 96–108)
CLARITY UR: ABNORMAL
CO2 SERPL-SCNC: 27 MMOL/L (ref 21–32)
COLOR UR: YELLOW
CREAT SERPL-MCNC: 0.58 MG/DL (ref 0.6–1.3)
EOSINOPHIL # BLD MANUAL: 0.36 THOUSAND/UL (ref 0–0.4)
EOSINOPHIL NFR BLD MANUAL: 4 % (ref 0–6)
ERYTHROCYTE [DISTWIDTH] IN BLOOD BY AUTOMATED COUNT: 11.7 % (ref 11.6–15.1)
EXT PREGNANCY TEST URINE: NEGATIVE
EXT. CONTROL: NORMAL
FLUAV AG UPPER RESP QL IA.RAPID: NEGATIVE
FLUBV AG UPPER RESP QL IA.RAPID: NEGATIVE
GFR SERPL CREATININE-BSD FRML MDRD: 124 ML/MIN/1.73SQ M
GLUCOSE SERPL-MCNC: 107 MG/DL (ref 65–140)
GLUCOSE UR STRIP-MCNC: NEGATIVE MG/DL
HCT VFR BLD AUTO: 44.3 % (ref 34.8–46.1)
HGB BLD-MCNC: 14.6 G/DL (ref 11.5–15.4)
HGB UR QL STRIP.AUTO: ABNORMAL
KETONES UR STRIP-MCNC: ABNORMAL MG/DL
LEUKOCYTE ESTERASE UR QL STRIP: ABNORMAL
LIPASE SERPL-CCNC: 18 U/L (ref 11–82)
LYMPHOCYTES # BLD AUTO: 0.9 THOUSAND/UL (ref 0.6–4.47)
LYMPHOCYTES # BLD AUTO: 10 % (ref 14–44)
MCH RBC QN AUTO: 31.4 PG (ref 26.8–34.3)
MCHC RBC AUTO-ENTMCNC: 33 G/DL (ref 31.4–37.4)
MCV RBC AUTO: 95 FL (ref 82–98)
MONOCYTES # BLD AUTO: 0.36 THOUSAND/UL (ref 0–1.22)
MONOCYTES NFR BLD: 4 % (ref 4–12)
NEUTROPHILS # BLD MANUAL: 7.35 THOUSAND/UL (ref 1.85–7.62)
NEUTS BAND NFR BLD MANUAL: 1 % (ref 0–8)
NEUTS SEG NFR BLD AUTO: 81 % (ref 43–75)
NITRITE UR QL STRIP: NEGATIVE
NON-SQ EPI CELLS URNS QL MICRO: ABNORMAL /HPF
PH UR STRIP.AUTO: 7.5 [PH]
PLATELET # BLD AUTO: 152 THOUSANDS/UL (ref 149–390)
PLATELET BLD QL SMEAR: ADEQUATE
PMV BLD AUTO: 12.7 FL (ref 8.9–12.7)
POTASSIUM SERPL-SCNC: 4 MMOL/L (ref 3.5–5.3)
PROT SERPL-MCNC: 7.4 G/DL (ref 6.4–8.4)
PROT UR STRIP-MCNC: ABNORMAL MG/DL
RBC # BLD AUTO: 4.65 MILLION/UL (ref 3.81–5.12)
RBC #/AREA URNS AUTO: ABNORMAL /HPF
RBC MORPH BLD: PRESENT
SARS-COV+SARS-COV-2 AG RESP QL IA.RAPID: NEGATIVE
SODIUM SERPL-SCNC: 136 MMOL/L (ref 135–147)
SP GR UR STRIP.AUTO: 1.02 (ref 1–1.03)
UROBILINOGEN UR STRIP-ACNC: <2 MG/DL
WBC # BLD AUTO: 8.96 THOUSAND/UL (ref 4.31–10.16)
WBC #/AREA URNS AUTO: ABNORMAL /HPF

## 2025-05-08 PROCEDURE — 81025 URINE PREGNANCY TEST: CPT | Performed by: EMERGENCY MEDICINE

## 2025-05-08 PROCEDURE — 96374 THER/PROPH/DIAG INJ IV PUSH: CPT

## 2025-05-08 PROCEDURE — 87804 INFLUENZA ASSAY W/OPTIC: CPT | Performed by: EMERGENCY MEDICINE

## 2025-05-08 PROCEDURE — 87811 SARS-COV-2 COVID19 W/OPTIC: CPT | Performed by: EMERGENCY MEDICINE

## 2025-05-08 PROCEDURE — 99284 EMERGENCY DEPT VISIT MOD MDM: CPT | Performed by: EMERGENCY MEDICINE

## 2025-05-08 PROCEDURE — 80053 COMPREHEN METABOLIC PANEL: CPT | Performed by: EMERGENCY MEDICINE

## 2025-05-08 PROCEDURE — 85027 COMPLETE CBC AUTOMATED: CPT | Performed by: EMERGENCY MEDICINE

## 2025-05-08 PROCEDURE — 96361 HYDRATE IV INFUSION ADD-ON: CPT

## 2025-05-08 PROCEDURE — 87086 URINE CULTURE/COLONY COUNT: CPT | Performed by: EMERGENCY MEDICINE

## 2025-05-08 PROCEDURE — 85007 BL SMEAR W/DIFF WBC COUNT: CPT | Performed by: EMERGENCY MEDICINE

## 2025-05-08 PROCEDURE — 84484 ASSAY OF TROPONIN QUANT: CPT | Performed by: EMERGENCY MEDICINE

## 2025-05-08 PROCEDURE — 83690 ASSAY OF LIPASE: CPT | Performed by: EMERGENCY MEDICINE

## 2025-05-08 PROCEDURE — 99283 EMERGENCY DEPT VISIT LOW MDM: CPT

## 2025-05-08 PROCEDURE — 81001 URINALYSIS AUTO W/SCOPE: CPT | Performed by: EMERGENCY MEDICINE

## 2025-05-08 PROCEDURE — 96375 TX/PRO/DX INJ NEW DRUG ADDON: CPT

## 2025-05-08 PROCEDURE — 87077 CULTURE AEROBIC IDENTIFY: CPT | Performed by: EMERGENCY MEDICINE

## 2025-05-08 PROCEDURE — 36415 COLL VENOUS BLD VENIPUNCTURE: CPT | Performed by: EMERGENCY MEDICINE

## 2025-05-08 RX ORDER — KETOROLAC TROMETHAMINE 30 MG/ML
15 INJECTION, SOLUTION INTRAMUSCULAR; INTRAVENOUS ONCE
Status: COMPLETED | OUTPATIENT
Start: 2025-05-08 | End: 2025-05-08

## 2025-05-08 RX ORDER — ONDANSETRON 2 MG/ML
4 INJECTION INTRAMUSCULAR; INTRAVENOUS ONCE
Status: COMPLETED | OUTPATIENT
Start: 2025-05-08 | End: 2025-05-08

## 2025-05-08 RX ORDER — ONDANSETRON 4 MG/1
4 TABLET, ORALLY DISINTEGRATING ORAL EVERY 6 HOURS PRN
Qty: 20 TABLET | Refills: 0 | Status: SHIPPED | OUTPATIENT
Start: 2025-05-08

## 2025-05-08 RX ORDER — CEPHALEXIN 500 MG/1
500 CAPSULE ORAL EVERY 12 HOURS SCHEDULED
Qty: 10 CAPSULE | Refills: 0 | Status: SHIPPED | OUTPATIENT
Start: 2025-05-08 | End: 2025-05-13

## 2025-05-08 RX ADMIN — KETOROLAC TROMETHAMINE 15 MG: 30 INJECTION, SOLUTION INTRAMUSCULAR at 08:29

## 2025-05-08 RX ADMIN — CEPHALEXIN 500 MG: 250 CAPSULE ORAL at 08:29

## 2025-05-08 RX ADMIN — SODIUM CHLORIDE 1000 ML: 0.9 INJECTION, SOLUTION INTRAVENOUS at 07:51

## 2025-05-08 RX ADMIN — ONDANSETRON 4 MG: 2 INJECTION INTRAMUSCULAR; INTRAVENOUS at 07:51

## 2025-05-08 NOTE — Clinical Note
Yarinne Veloz Torres was seen and treated in our emergency department on 5/8/2025.                Diagnosis:     Yarinne  .    She may return on this date:     Please excuse for any absence from work 5/8/2025, 5/9/2025, 5/10/2025 as needed.     If you have any questions or concerns, please don't hesitate to call.      Houston Sharma, DO    ______________________________           _______________          _______________  Hospital Representative                              Date                                Time

## 2025-05-08 NOTE — DISCHARGE INSTRUCTIONS
Thank you for visiting the Emergency Department today.    NORMAL LABS  URINE SHOWS UTI --> TAKE KEFLEX FOR 5 DAYS (Rx ordered)  ZOFRAN FOR NAUSEA/VOMITING (Rx ordered)    Return if symptoms worsen or other concerns

## 2025-05-08 NOTE — ED PROVIDER NOTES
Time reflects when diagnosis was documented in both MDM as applicable and the Disposition within this note       Time User Action Codes Description Comment    5/8/2025  7:43 AM Houston Sharma [R11.2] Nausea and vomiting     5/8/2025  8:25 AM Houston Sharma [N39.0] UTI (urinary tract infection)           ED Disposition       ED Disposition   Discharge    Condition   Stable    Date/Time   Thu May 8, 2025  8:25 AM    Comment   Yarinne Veloz Chávez discharge to home/self care.                   Assessment & Plan       Medical Decision Making  30-year-old female presenting with nausea vomiting epigastric abdominal pain differential diagnosis includes gastritis enteritis colitis esophagitis GERD viral syndrome UTI pregnancy gastroparesis pancreatitis cholecystitis choledocholithiasis.  Obtain screening labs provide IV fluids antiemetic check urine and provide analgesia if labs reassuring will discharge with suspicion for viral etiology.  If labs are normal we will pursue further imaging.  Low suspicion for surgical process based on exam.    Amount and/or Complexity of Data Reviewed  Labs: ordered.    Risk  Prescription drug management.             Medications   sodium chloride 0.9 % bolus 1,000 mL (1,000 mL Intravenous New Bag 5/8/25 0751)   cephalexin (KEFLEX) capsule 500 mg (has no administration in time range)   ketorolac (TORADOL) injection 15 mg (has no administration in time range)   ondansetron (ZOFRAN) injection 4 mg (4 mg Intravenous Given 5/8/25 0751)       ED Risk Strat Scores                    No data recorded        SBIRT 20yo+      Flowsheet Row Most Recent Value   Initial Alcohol Screen: US AUDIT-C     1. How often do you have a drink containing alcohol? 0 Filed at: 05/08/2025 0721   2. How many drinks containing alcohol do you have on a typical day you are drinking?  0 Filed at: 05/08/2025 0721   3a. Male UNDER 65: How often do you have five or more drinks on one occasion? 0 Filed at:  05/08/2025 0721   3b. FEMALE Any Age, or MALE 65+: How often do you have 4 or more drinks on one occassion? 0 Filed at: 05/08/2025 0721   Audit-C Score 0 Filed at: 05/08/2025 0721   IAN: How many times in the past year have you...    Used an illegal drug or used a prescription medication for non-medical reasons? Never Filed at: 05/08/2025 0721                            History of Present Illness       Chief Complaint   Patient presents with    Vomiting     Vomiting and diarrhea/abdominal pain for 2 days with decreased appetite        Past Medical History:   Diagnosis Date    Blood clot embolism during pregnancy, antepartum       History reviewed. No pertinent surgical history.   History reviewed. No pertinent family history.   Social History     Tobacco Use    Smoking status: Never    Smokeless tobacco: Never   Substance Use Topics    Alcohol use: Never    Drug use: Never      E-Cigarette/Vaping      E-Cigarette/Vaping Substances      I have reviewed and agree with the history as documented.     30-year-old female with past medical history significant for provoked PE in the setting of pregnancy who completed anticoagulation no longer on chronic anticoagulation presented to the ER for evaluation of nausea vomiting abdominal pain.    Patient is here with 3-year-old daughter.  They are having similar symptoms however daughter symptoms began about 5 days ago.  Patient's symptoms began in the last 24 hours.  She notes epigastric abdominal discomfort without radiation, nausea and vomiting, nonbloody, nonbilious.  No recent travel or suspicious food intake.  Positive sick contacts.  No diarrhea.  No prior abdominal surgical history.  Reports 4 episodes of vomiting this morning.          Review of Systems   Constitutional:  Negative for chills and fever.   HENT:  Negative for ear pain and sore throat.    Eyes:  Negative for pain and visual disturbance.   Respiratory:  Negative for cough and shortness of breath.     Cardiovascular:  Negative for chest pain and palpitations.   Gastrointestinal:  Positive for abdominal pain, nausea and vomiting.   Genitourinary:  Negative for dysuria and hematuria.   Musculoskeletal:  Negative for arthralgias and back pain.   Skin:  Negative for color change and rash.   Neurological:  Negative for seizures and syncope.   All other systems reviewed and are negative.          Objective       ED Triage Vitals   Temperature Pulse Blood Pressure Respirations SpO2 Patient Position - Orthostatic VS   05/08/25 0719 05/08/25 0720 05/08/25 0720 05/08/25 0720 05/08/25 0720 05/08/25 0720   97.8 °F (36.6 °C) 83 102/62 18 95 % Sitting      Temp Source Heart Rate Source BP Location FiO2 (%) Pain Score    05/08/25 0719 05/08/25 0720 05/08/25 0720 -- 05/08/25 0720    Temporal Monitor Right arm  6      Vitals      Date and Time Temp Pulse SpO2 Resp BP Pain Score FACES Pain Rating User   05/08/25 0800 -- 78 96 % 16 131/58 -- -- CLS   05/08/25 0720 -- 83 95 % 18 102/62 6 -- CLS   05/08/25 0719 97.8 °F (36.6 °C) -- -- -- -- -- -- [facility]            Physical Exam  Vitals and nursing note reviewed.   Constitutional:       General: She is not in acute distress.     Appearance: She is well-developed. She is not ill-appearing, toxic-appearing or diaphoretic.   HENT:      Head: Normocephalic and atraumatic.      Right Ear: External ear normal.      Left Ear: External ear normal.      Nose: Nose normal.      Mouth/Throat:      Mouth: Mucous membranes are moist.      Pharynx: Oropharynx is clear.   Eyes:      Conjunctiva/sclera: Conjunctivae normal.   Cardiovascular:      Rate and Rhythm: Normal rate and regular rhythm.      Heart sounds: No murmur heard.  Pulmonary:      Effort: Pulmonary effort is normal. No respiratory distress.      Breath sounds: Normal breath sounds.   Abdominal:      General: Abdomen is flat.      Palpations: Abdomen is soft.      Tenderness: There is abdominal tenderness in the epigastric area. There  is no right CVA tenderness, left CVA tenderness, guarding or rebound. Negative signs include Guzman's sign and McBurney's sign.   Musculoskeletal:         General: No swelling.      Cervical back: Neck supple.   Skin:     General: Skin is warm and dry.      Capillary Refill: Capillary refill takes less than 2 seconds.   Neurological:      General: No focal deficit present.      Mental Status: She is alert.   Psychiatric:         Mood and Affect: Mood normal.         Results Reviewed       Procedure Component Value Units Date/Time    High Sensitivity Troponin I Random [[id number]]  (Abnormal) Collected: 05/08/25 0751    Lab Status: Final result Specimen: Blood from Arm, Left Updated: 05/08/25 0820     HS TnI random <2 ng/L     FLU/COVID Rapid Antigen (30 min. TAT) - Preferred screening test in ED [[id number]]  (Normal) Collected: 05/08/25 0751    Lab Status: Final result Specimen: Nares from Nose Updated: 05/08/25 0817     SARS COV Rapid Antigen Negative     Influenza A Rapid Antigen Negative     Influenza B Rapid Antigen Negative    Narrative:      This test has been performed using the Quidel Rayna 2 FLU+SARS Antigen test under the Emergency Use Authorization (EUA). This test has been validated by the  and verified by the performing laboratory. The Rayna uses lateral flow immunofluorescent sandwich assay to detect SARS-COV, Influenza A and Influenza B Antigen.     The Quidel Rayna 2 SARS Antigen test does not differentiate between SARS-CoV and SARS-CoV-2.     Negative results are presumptive and may be confirmed with a molecular assay, if necessary, for patient management. Negative results do not rule out SARS-CoV-2 or influenza infection and should not be used as the sole basis for treatment or patient management decisions. A negative test result may occur if the level of antigen in a sample is below the limit of detection of this test.     Positive results are indicative of the presence of viral  antigens, but do not rule out bacterial infection or co-infection with other viruses.     All test results should be used as an adjunct to clinical observations and other information available to the provider.    FOR PEDIATRIC PATIENTS - copy/paste COVID Guidelines URL to browser: [email or web address]    Comprehensive metabolic panel [[id number]]  (Abnormal) Collected: 05/08/25 0751    Lab Status: Final result Specimen: Blood from Arm, Left Updated: 05/08/25 0815     Sodium 136 mmol/L      Potassium 4.0 mmol/L      Chloride 101 mmol/L      CO2 27 mmol/L      ANION GAP 8 mmol/L      BUN 13 mg/dL      Creatinine 0.58 mg/dL      Glucose 107 mg/dL      Calcium 9.0 mg/dL      AST 15 U/L      ALT 13 U/L      Alkaline Phosphatase 53 U/L      Total Protein 7.4 g/dL      Albumin 4.6 g/dL      Total Bilirubin 0.76 mg/dL      eGFR 124 ml/min/1.73sq m     Narrative:      [employer] Kid[employer] guidelines for Chronic Kidney Disease (CKD):     Stage 1 with normal or high GFR (GFR > 90 mL/min/1.73 square meters)    Stage 2 Mild CKD (GFR = 60-89 mL/min/1.73 square meters)    Stage 3A Moderate CKD (GFR = 45-59 mL/min/1.73 square meters)    Stage 3B Moderate CKD (GFR = 30-44 mL/min/1.73 square meters)    Stage 4 Severe CKD (GFR = 15-29 mL/min/1.73 square meters)    Stage 5 End Stage CKD (GFR <15 mL/min/1.73 square meters)  Note: GFR calculation is accurate only with a steady state creatinine    Lipase [[id number]]  (Normal) Collected: 05/08/25 0751    Lab Status: Final result Specimen: Blood from Arm, Left Updated: 05/08/25 0815     Lipase 18 u/L     CBC and differential [[id number]]  (Normal) Collected: 05/08/25 0751    Lab Status: Final result Specimen: Blood from Arm, Left Updated: 05/08/25 0801     WBC 8.96 Thousand/uL      RBC 4.65 Million/uL      Hemoglobin 14.6 g/dL      Hematocrit 44.3 %      MCV 95 fL      MCH 31.4 pg      MCHC 33.0 g/dL      RDW 11.7 %      MPV  12.7 fL      Platelets 152 Thousands/uL     Narrative:      This is an appended report.  These results have been appended to a previously verified report.    Manual Differential(PHLEBS Do Not Order) [[id number]] Collected: 05/08/25 0751    Lab Status: In process Specimen: Blood from Arm, Left Updated: 05/08/25 0801    Urine Microscopic [[id number]]  (Abnormal) Collected: 05/08/25 0734    Lab Status: Final result Specimen: Urine, Clean Catch Updated: 05/08/25 0746     RBC, UA 2-4 /hpf      WBC, UA 10-20 /hpf      Epithelial Cells Moderate /hpf      Bacteria, UA Innumerable /hpf     Urine culture [[id number]] Collected: 05/08/25 0734    Lab Status: In process Specimen: Urine, Clean Catch Updated: 05/08/25 0746    UA w Reflex to Microscopic w Reflex to Culture [[id number]]  (Abnormal) Collected: 05/08/25 0734    Lab Status: Final result Specimen: Urine, Clean Catch Updated: 05/08/25 0740     Color, UA Yellow     Clarity, UA Cloudy     Specific Gravity, UA 1.020     pH, UA 7.5     Leukocytes, UA Large     Nitrite, UA Negative     Protein, UA Trace mg/dl      Glucose, UA Negative mg/dl      Ketones, UA Trace mg/dl      Urobilinogen, UA <2.0 mg/dl      Bilirubin, UA Negative     Occult Blood, UA Trace    POCT pregnancy, urine [[id number]]  (Normal) Collected: 05/08/25 0735    Lab Status: Final result Updated: 05/08/25 0736     EXT Preg Test, Ur Negative     Control Valid            No orders to display       Procedures    ED Medication and Procedure Management   Prior to Admission Medications   Prescriptions Last Dose Informant Patient Reported? Taking?   albuterol (Ventolin HFA) 90 mcg/act inhaler Not Taking  No No   Sig: Inhale 2 puffs every 6 (six) hours as needed for wheezing or shortness of breath   Patient not taking: Reported on 5/8/2025   naproxen (Naprosyn) 500 mg tablet Not Taking  No No   Sig: Take 1 tablet (500 mg total) by mouth 2 (two) times a day with meals   Patient not taking: Reported on 5/8/2025    nitrofurantoin (MACROBID) 100 mg capsule Not Taking  No No   Sig: Take 1 capsule (100 mg total) by mouth 2 (two) times a day   Patient not taking: Reported on 5/8/2025   ondansetron (Zofran ODT) 4 mg disintegrating tablet Not Taking  No No   Sig: Take 1 tablet (4 mg total) by mouth every 6 (six) hours as needed for nausea or vomiting   Patient not taking: Reported on 5/8/2025      Facility-Administered Medications: None     Patient's Medications   Discharge Prescriptions    CEPHALEXIN (KEFLEX) 500 MG CAPSULE    Take 1 capsule (500 mg total) by mouth every 12 (twelve) hours for 5 days       Start Date: 5/8/2025  End Date: 5/13/2025       Order Dose: 500 mg       Quantity: 10 capsule    Refills: 0    ONDANSETRON (ZOFRAN-ODT) 4 MG DISINTEGRATING TABLET    Take 1 tablet (4 mg total) by mouth every 6 (six) hours as needed for nausea or vomiting       Start Date: 5/8/2025  End Date: --       Order Dose: 4 mg       Quantity: 20 tablet    Refills: 0     No discharge procedures on file.  ED SEPSIS DOCUMENTATION   Time reflects when diagnosis was documented in both MDM as applicable and the Disposition within this note       Time User Action Codes Description Comment    5/8/2025  7:43 AM Houston Sharam [R11.2] Nausea and vomiting     5/8/2025  8:25 AM Houston Sharma [N39.0] UTI (urinary tract infection)                  Houston Sharma DO  05/08/25 0827

## 2025-05-10 LAB
BACTERIA UR CULT: ABNORMAL
BACTERIA UR CULT: ABNORMAL